# Patient Record
Sex: FEMALE
[De-identification: names, ages, dates, MRNs, and addresses within clinical notes are randomized per-mention and may not be internally consistent; named-entity substitution may affect disease eponyms.]

---

## 2017-01-10 DIAGNOSIS — Z87.09 PERSONAL HISTORY OF OTHER DISEASES OF THE RESPIRATORY SYSTEM: ICD-10-CM

## 2017-01-10 DIAGNOSIS — Z87.898 PERSONAL HISTORY OF OTHER SPECIFIED CONDITIONS: ICD-10-CM

## 2017-01-10 DIAGNOSIS — Z87.19 PERSONAL HISTORY OF OTHER DISEASES OF THE DIGESTIVE SYSTEM: ICD-10-CM

## 2017-01-10 DIAGNOSIS — D51.0 VITAMIN B12 DEFICIENCY ANEMIA DUE TO INTRINSIC FACTOR DEFICIENCY: ICD-10-CM

## 2017-01-10 DIAGNOSIS — Z86.79 PERSONAL HISTORY OF OTHER DISEASES OF THE CIRCULATORY SYSTEM: ICD-10-CM

## 2017-01-10 DIAGNOSIS — Z86.39 PERSONAL HISTORY OF OTHER ENDOCRINE, NUTRITIONAL AND METABOLIC DISEASE: ICD-10-CM

## 2017-01-10 RX ORDER — LEVALBUTEROL TARTRATE 45 UG/1
AEROSOL, METERED ORAL
Refills: 0 | Status: ACTIVE | COMMUNITY

## 2017-01-10 RX ORDER — UBIDECARENONE/VIT E ACET 100MG-5
CAPSULE ORAL
Refills: 0 | Status: ACTIVE | COMMUNITY

## 2017-01-10 RX ORDER — MULTIVIT-MIN/IRON/FOLIC ACID/K 18-600-40
CAPSULE ORAL
Refills: 0 | Status: ACTIVE | COMMUNITY

## 2017-01-10 RX ORDER — PNV NO.95/FERROUS FUM/FOLIC AC 28MG-0.8MG
TABLET ORAL
Refills: 0 | Status: ACTIVE | COMMUNITY

## 2017-01-11 ENCOUNTER — APPOINTMENT (OUTPATIENT)
Dept: ELECTROPHYSIOLOGY | Facility: CLINIC | Age: 42
End: 2017-01-11

## 2017-01-11 VITALS
HEART RATE: 85 BPM | WEIGHT: 126 LBS | SYSTOLIC BLOOD PRESSURE: 95 MMHG | OXYGEN SATURATION: 99 % | DIASTOLIC BLOOD PRESSURE: 65 MMHG | HEIGHT: 64 IN | BODY MASS INDEX: 21.51 KG/M2

## 2017-01-11 DIAGNOSIS — R00.0 TACHYCARDIA, UNSPECIFIED: ICD-10-CM

## 2017-01-11 DIAGNOSIS — Z86.19 PERSONAL HISTORY OF OTHER INFECTIOUS AND PARASITIC DISEASES: ICD-10-CM

## 2017-01-11 DIAGNOSIS — I95.1 ORTHOSTATIC HYPOTENSION: ICD-10-CM

## 2017-01-11 DIAGNOSIS — F41.9 ANXIETY DISORDER, UNSPECIFIED: ICD-10-CM

## 2017-02-13 ENCOUNTER — APPOINTMENT (OUTPATIENT)
Dept: ELECTROPHYSIOLOGY | Facility: CLINIC | Age: 42
End: 2017-02-13

## 2017-02-13 ENCOUNTER — APPOINTMENT (OUTPATIENT)
Dept: DERMATOLOGY | Facility: CLINIC | Age: 42
End: 2017-02-13

## 2017-02-13 DIAGNOSIS — L20.82 FLEXURAL ECZEMA: ICD-10-CM

## 2017-02-13 DIAGNOSIS — L57.8 OTHER SKIN CHANGES DUE TO CHRONIC EXPOSURE TO NONIONIZING RADIATION: ICD-10-CM

## 2017-02-13 DIAGNOSIS — N90.89 OTHER SPECIFIED NONINFLAMMATORY DISORDERS OF VULVA AND PERINEUM: ICD-10-CM

## 2017-02-13 DIAGNOSIS — Z87.09 PERSONAL HISTORY OF OTHER DISEASES OF THE RESPIRATORY SYSTEM: ICD-10-CM

## 2017-02-13 DIAGNOSIS — Z87.2 PERSONAL HISTORY OF DISEASES OF THE SKIN AND SUBCUTANEOUS TISSUE: ICD-10-CM

## 2017-03-06 PROBLEM — I95.1 HYPOTENSION, POSTURAL: Status: ACTIVE | Noted: 2017-03-06

## 2017-03-06 PROBLEM — F41.9 ANXIETY: Status: ACTIVE | Noted: 2017-01-10

## 2019-07-22 ENCOUNTER — LABORATORY RESULT (OUTPATIENT)
Age: 44
End: 2019-07-22

## 2019-07-22 ENCOUNTER — APPOINTMENT (OUTPATIENT)
Dept: DERMATOLOGY | Facility: CLINIC | Age: 44
End: 2019-07-22
Payer: OTHER GOVERNMENT

## 2019-07-22 DIAGNOSIS — D48.9 NEOPLASM OF UNCERTAIN BEHAVIOR, UNSPECIFIED: ICD-10-CM

## 2019-07-22 DIAGNOSIS — L30.9 DERMATITIS, UNSPECIFIED: ICD-10-CM

## 2019-07-22 DIAGNOSIS — L70.0 ACNE VULGARIS: ICD-10-CM

## 2019-07-22 PROCEDURE — 99214 OFFICE O/P EST MOD 30 MIN: CPT | Mod: 25

## 2019-07-22 PROCEDURE — 11102 TANGNTL BX SKIN SINGLE LES: CPT

## 2019-07-22 RX ORDER — MIDODRINE HYDROCHLORIDE 2.5 MG/1
2.5 TABLET ORAL
Qty: 60 | Refills: 4 | Status: DISCONTINUED | COMMUNITY
Start: 2017-01-11 | End: 2019-07-22

## 2019-07-22 RX ORDER — HYDROCORTISONE 25 MG/G
2.5 OINTMENT TOPICAL TWICE DAILY
Qty: 1 | Refills: 0 | Status: ACTIVE | COMMUNITY
Start: 2019-07-22 | End: 1900-01-01

## 2019-07-22 RX ORDER — SELENIUM 50 MCG
TABLET ORAL
Refills: 0 | Status: DISCONTINUED | COMMUNITY
End: 2019-07-22

## 2019-07-29 ENCOUNTER — MOBILE ON CALL (OUTPATIENT)
Age: 44
End: 2019-07-29

## 2019-07-30 LAB — CORE LAB BIOPSY: NORMAL

## 2020-02-05 ENCOUNTER — APPOINTMENT (OUTPATIENT)
Dept: PEDIATRIC MEDICAL GENETICS | Facility: CLINIC | Age: 45
End: 2020-02-05
Payer: COMMERCIAL

## 2020-02-05 VITALS
WEIGHT: 147.71 LBS | BODY MASS INDEX: 25.22 KG/M2 | HEART RATE: 98 BPM | DIASTOLIC BLOOD PRESSURE: 77 MMHG | HEIGHT: 64 IN | SYSTOLIC BLOOD PRESSURE: 116 MMHG

## 2020-02-05 DIAGNOSIS — T14.8XXD OTHER INJURY OF UNSPECIFIED BODY REGION, SUBSEQUENT ENCOUNTER: ICD-10-CM

## 2020-02-05 DIAGNOSIS — R20.2 PARESTHESIA OF SKIN: ICD-10-CM

## 2020-02-05 DIAGNOSIS — I95.1 TACHYCARDIA, UNSPECIFIED: ICD-10-CM

## 2020-02-05 DIAGNOSIS — E56.9 VITAMIN DEFICIENCY, UNSPECIFIED: ICD-10-CM

## 2020-02-05 DIAGNOSIS — K31.84 GASTROPARESIS: ICD-10-CM

## 2020-02-05 DIAGNOSIS — R00.0 TACHYCARDIA, UNSPECIFIED: ICD-10-CM

## 2020-02-05 PROCEDURE — XXXXX: CPT

## 2020-02-07 LAB — TRYPTASE: 4.9 NG/ML

## 2020-02-08 LAB — COPPER SERPL-MCNC: 152 UG/DL

## 2020-02-10 LAB
HISTAMINE BLD-MCNC: 0.44 NG/ML
VIT C SERPL-MCNC: 1.8 MG/DL

## 2020-02-11 LAB
CARN ESTERS SERPL-MCNC: 8.9 UMOL/L
CARNITINE FREE SERPL-SCNC: 25.9 UMOL/L
CARNITINE FREE SFR SERPL: 0.3 UMOL/L
CARNITINE SERPL-SCNC: 34.8 UMOL/L

## 2020-02-16 LAB — MENADIONE SERPL-MCNC: 0.67 NG/ML

## 2020-02-24 NOTE — FAMILY HISTORY
[FreeTextEntry1] : Rajni is the only child of a nonconsanguineous couple. Maternal and paternal ancestry reported as Turkmen. No Ashkenazi Islam ancestry reported. Ms. Mathew has a paternal half brother, age 15, who is in good health.\par \par Her father, age 75, has a history of alcohol abuse. Her mother, age 72 has similar symptoms to Rajni. She has IBS that flares when she is anxious. She also reportedly has symptoms of POTS but has not been formally diagnosed. She also has muscle pains. She was born prematurely, but Rajni is not sure how premature. \par \par Maternal family history includes grandmother who  of breast cancer in her 60s, great-grandmother who  in her 60s of a blood clot, and grandfather who  in his 80s of heart disease. Paternal family history includes an uncle who  of esophageal cancer in his 50s, aunt in her 80s with frequent allergies and insomnia, female first cousin, age 53, with chronic fatigue, allergies, hypothyroidism, and fertility issues, grandmother who  in her 70s and had chornic fatigue, allergies, and non- Hodgkins lymphoma in her 50s-60s.\par \par Family history is negative for birth defects, intellectual delay, and autism.

## 2020-02-24 NOTE — REVIEW OF SYSTEMS
[FreeTextEntry1] : +Myopia (-3.75).  No hearing loss.  Had prior reportedly normal brain MRI for transient high prolactin levels.  Intermittent headaches. [place] tooth extractions but no root canals, no palate expander but braces for overbite.  +TMJ since childhood.  +Asthma, stable over the years.  No syncope in a few years.  Off medications because trying to become pregnant.  GI symptoms have improved on gastritis and FODMAP diet.  Gluten intolerance, yeast intolerance.  Has an epipen due to prior anaphylactoid reaction.  Has not had tryptase testing.  Takes prenatal vitamins and Vitamin D now. Has had iron, D, B12, and iodine deficiencies.  +Easy bruising. Slow wound healing with widening. Thin nails.  Has fractured wrist and ankle once.  +Intermittent finger joint discomfort.  +Burning pains throughout body starting in late teens, improved with POTS improvement, but has returned with return of POTS symptoms.  Never had an EMG/NCV or small fiber nerve biopsy.  +Arthralgias hips and neck often.  No dislocations or chronic subluxations.

## 2020-02-24 NOTE — HISTORY OF PRESENT ILLNESS
[FreeTextEntry1] : In childhood reports environmental allergies, confirmed at 11 years of age with testing and asthma.  Developed frequent upper respiratory infections.  Also reports having chronic fatigue, daytime sleepiness.  Was found to have anemia with iron deficiency, supplemental B12 taken.  Was athletic, was able to keep up with peers. Intermittent musculoskeletal pains.  Did note was flexible, but not concerningly or excessively so.  Was diagnosed with scoliosis.  1st menstrual period at age 11.  Competed in multiple sports in high school.   During college started developing episodic dizziness and pre-syncope, was evaluated by Cardiology with echocardiography showing reportedly mitral valve prolapse.  Later diagnosed with POTS in 2014.  Started developing chronic diffuse pains.  Was found to have B12 deficiency.  Developed abdominal discomfort chronic diarrhea and vomiting, given acid inhibitors, thought to have inflammatory bowel disease and auto-immune pernicious anemia, now attributed to gastroparesis, SIBO, and nonspecific gastric inflammation.  Followed by GI doctors. Followed by Cardiology and Neurology (Dr. Rodriguez). Followed by Immunologist evaluating for possible IgA deficiency, and subclinical hypothyroidism by Endocrinology.

## 2020-02-24 NOTE — BIRTH HISTORY
[FreeTextEntry1] : Born on [place], full-term, , no birth defects or congenital dislocations.

## 2020-02-24 NOTE — PHYSICAL EXAM
[Normal] : normal palmar creases without syndactyly or other anomalies [Cranial Nerves] : cranial nerves are normal [Muscle tone/ strength] : muscle tone/ strength is normal [Fine Motor Coordination] : fine motor coordination is normal [DTR] : deep tendon reflexes are normal [Total Score ___] : Total Score = [unfilled] [de-identified] : +Soft/velvety skin texture, normal extensibility, minimal flank striae (post-pubertal onset), +slightly widened post-injury scars at right thigh and left ankle, no spontaneous/atrophic/papyraceous/hemosideritic scarring, no prematurely aged appearance, no paucity of SQ fat, mildly thin transluscent skin in antecubital region but no abnormal vasculature at anterior chest wall or dorsum of hands and feet, no piezogenic heel papules [de-identified] : Pleasant, conversant, cooperative  female. [de-identified] : HC 55 cm, normal HL/HW, non-dysmorphic [de-identified] : No cc/si, +rr b/l [de-identified] : Normal shape, no p/t/c, slightly low set [de-identified] : Normal nasal b/n/t, normal philtrum [de-identified] : Normal uvula, good dentition [de-identified] : +systolic murmur, +variable heart rate [de-identified] : No pectus deformity [de-identified] : Normal h/f c/n/d, mild ankle edema non-pitting, no arachnodactyly, negative wrist/thumb signs [de-identified] : AS: 170cm, HT: 165cm, AS:HT Ratio:  <1.05  [de-identified] : Normal gait [Right] : Right: N [Left] : Left: N [] : No

## 2020-11-25 ENCOUNTER — NON-APPOINTMENT (OUTPATIENT)
Age: 45
End: 2020-11-25

## 2021-01-30 ENCOUNTER — TRANSCRIPTION ENCOUNTER (OUTPATIENT)
Age: 46
End: 2021-01-30

## 2021-02-11 ENCOUNTER — APPOINTMENT (OUTPATIENT)
Dept: OBGYN | Facility: CLINIC | Age: 46
End: 2021-02-11
Payer: OTHER GOVERNMENT

## 2021-02-11 VITALS
TEMPERATURE: 98 F | HEART RATE: 80 BPM | SYSTOLIC BLOOD PRESSURE: 118 MMHG | WEIGHT: 157.6 LBS | DIASTOLIC BLOOD PRESSURE: 62 MMHG | BODY MASS INDEX: 27.05 KG/M2

## 2021-02-11 LAB
BILIRUB UR QL STRIP: NORMAL
GLUCOSE UR-MCNC: NORMAL
HCG UR QL: 0.2 EU/DL
HGB UR QL STRIP.AUTO: NORMAL
KETONES UR-MCNC: NORMAL
LEUKOCYTE ESTERASE UR QL STRIP: NORMAL
NITRITE UR QL STRIP: NORMAL
PH UR STRIP: 7
PROT UR STRIP-MCNC: NORMAL
SP GR UR STRIP: 1.01

## 2021-02-11 PROCEDURE — 99386 PREV VISIT NEW AGE 40-64: CPT

## 2021-02-11 PROCEDURE — 99072 ADDL SUPL MATRL&STAF TM PHE: CPT

## 2021-02-11 NOTE — PROCEDURE
[Cervical Pap Smear] : cervical Pap smear [Liquid Base] : liquid base [Tolerated Well] : the patient tolerated the procedure well [de-identified] : 1x1mm endocervical polyp noted, pap brush caused ~5 mL bleeding, pressure applied with cotton swab and bleeding stopped

## 2021-02-11 NOTE — HISTORY OF PRESENT ILLNESS
[TextBox_4] : Pt here for annual GYN visit with questions about fertility workup, had one unsuccessful round of IUI, was unhappy with care with previous GETACHEW,  has appointment with Dr. Blackman scheduled for next week [Mammogramdate] : 2019 [BreastSonogramDate] : 2019 [PapSmeardate] : 2019

## 2021-02-11 NOTE — PHYSICAL EXAM
[Alert] : alert [Appropriately responsive] : appropriately responsive [No Acute Distress] : no acute distress [No Lymphadenopathy] : no lymphadenopathy [Regular Rate Rhythm] : regular rate rhythm [No Murmurs] : no murmurs [Clear to Auscultation B/L] : clear to auscultation bilaterally [Soft] : soft [Non-tender] : non-tender [Non-distended] : non-distended [No HSM] : No HSM [No Lesions] : no lesions [No Mass] : no mass [Oriented x3] : oriented x3 [Examination Of The Breasts] : a normal appearance [No Masses] : no breast masses were palpable [Labia Majora] : normal [Labia Minora] : normal [Normal] : normal [Uterine Adnexae] : normal

## 2021-02-17 LAB — HPV HIGH+LOW RISK DNA PNL CVX: NOT DETECTED

## 2021-02-19 LAB — CYTOLOGY CVX/VAG DOC THIN PREP: NORMAL

## 2021-06-30 ENCOUNTER — APPOINTMENT (OUTPATIENT)
Dept: DERMATOLOGY | Facility: CLINIC | Age: 46
End: 2021-06-30
Payer: OTHER GOVERNMENT

## 2021-06-30 ENCOUNTER — APPOINTMENT (OUTPATIENT)
Dept: DERMATOLOGY | Facility: CLINIC | Age: 46
End: 2021-06-30
Payer: SELF-PAY

## 2021-06-30 DIAGNOSIS — L81.4 OTHER MELANIN HYPERPIGMENTATION: ICD-10-CM

## 2021-06-30 DIAGNOSIS — D22.9 MELANOCYTIC NEVI, UNSPECIFIED: ICD-10-CM

## 2021-06-30 DIAGNOSIS — Z00.00 ENCOUNTER FOR GENERAL ADULT MEDICAL EXAMINATION W/OUT ABNORMAL FINDINGS: ICD-10-CM

## 2021-06-30 PROCEDURE — 99213 OFFICE O/P EST LOW 20 MIN: CPT

## 2021-06-30 PROCEDURE — 0036D: CPT

## 2021-06-30 PROCEDURE — 0030D: CPT

## 2021-06-30 PROCEDURE — 99072 ADDL SUPL MATRL&STAF TM PHE: CPT

## 2021-06-30 RX ORDER — MIDODRINE HYDROCHLORIDE 2.5 MG/1
2.5 TABLET ORAL
Refills: 0 | Status: ACTIVE | COMMUNITY

## 2021-06-30 NOTE — HISTORY OF PRESENT ILLNESS
[FreeTextEntry1] : Patient presents for skin examination. [de-identified] : Notes lesion of the left cheek.  Present for months, and persistent despite exfolitation.

## 2021-06-30 NOTE — ASSESSMENT
[FreeTextEntry1] : A complete skin examination was performed.  There is no evidence of skin cancer.  We discussed the importance of photoprotection, including the use of hats, protective clothing and sunscreens with an SPF of at least 30.  Sun avoidance was also discussed.  The ABCDE's of melanoma was discussed.  Regular skin exams recommended.\par \par Discussed IPL for the face.\par Cosmetic at [facility]/tx for 1-3+ txs as desired.\par Photoprotection emphasized.

## 2021-06-30 NOTE — PHYSICAL EXAM
[Alert] : alert [Oriented x 3] : ~L oriented x 3 [Well Nourished] : well nourished [Full Body Skin Exam Performed] : performed [FreeTextEntry3] : A full skin exam was performed including the scalp, face, neck, chest, abdomen, back, buttocks, upper extremities and lower extremities.  The patient declined examination of the breasts and genitalia.  \par The exam did show the following benign growths:\par [place] pigmented nevi - all small and regular.\par Lentigines - face, mild, including the left cheek macule.\par \par